# Patient Record
Sex: FEMALE | URBAN - METROPOLITAN AREA
[De-identification: names, ages, dates, MRNs, and addresses within clinical notes are randomized per-mention and may not be internally consistent; named-entity substitution may affect disease eponyms.]

---

## 2022-05-23 ENCOUNTER — APPOINTMENT (RX ONLY)
Dept: URBAN - METROPOLITAN AREA CLINIC 1 | Facility: CLINIC | Age: 32
Setting detail: DERMATOLOGY
End: 2022-05-23

## 2022-05-23 DIAGNOSIS — L81.0 POSTINFLAMMATORY HYPERPIGMENTATION: ICD-10-CM

## 2022-05-23 DIAGNOSIS — L50.8 OTHER URTICARIA: ICD-10-CM

## 2022-05-23 PROCEDURE — ? COUNSELING

## 2022-05-23 PROCEDURE — 99204 OFFICE O/P NEW MOD 45 MIN: CPT

## 2022-05-23 PROCEDURE — ? ORDER TESTS

## 2022-05-23 PROCEDURE — ? TREATMENT REGIMEN

## 2022-05-23 PROCEDURE — ? PRESCRIPTION

## 2022-05-23 RX ORDER — AMMONIUM LACTATE 12 %
LOTION (GRAM) TOPICAL
Qty: 227 | Refills: 1 | Status: ERX | COMMUNITY
Start: 2022-05-23

## 2022-05-23 RX ADMIN — Medication: at 00:00

## 2022-05-23 ASSESSMENT — LOCATION DETAILED DESCRIPTION DERM
LOCATION DETAILED: RIGHT ANTERIOR DISTAL THIGH
LOCATION DETAILED: PERIUMBILICAL SKIN
LOCATION DETAILED: RIGHT ANTECUBITAL SKIN

## 2022-05-23 ASSESSMENT — LOCATION ZONE DERM
LOCATION ZONE: TRUNK
LOCATION ZONE: ARM
LOCATION ZONE: LEG

## 2022-05-23 ASSESSMENT — LOCATION SIMPLE DESCRIPTION DERM
LOCATION SIMPLE: RIGHT UPPER ARM
LOCATION SIMPLE: ABDOMEN
LOCATION SIMPLE: RIGHT THIGH

## 2022-05-23 NOTE — HPI: DISCOLORATION (HYPERPIGMENTATION)
Is This A New Presentation, Or A Follow-Up?: Discoloration
How Severe Is It?: moderate
Additional History: Pt would like to discuss options for treating.

## 2022-05-23 NOTE — PROCEDURE: TREATMENT REGIMEN
Plan: 5/23/2022\\nCurrently not flared; reviewed patient’s pictures on cell phone \\nPt is using triamcinolone cream given by previous derm.\\nOrder lab work today—discussed with patient of the need to rule out H.Pylori in addition to Alpha Gal\\nPreviously evaluated by an allergist
Detail Level: Zone
Initiate Treatment: ammonium lactate 12 % lotion Apply to affected areas on extremities bid

## 2022-05-23 NOTE — HPI: RASH
What Type Of Note Output Would You Prefer (Optional)?: Bullet Format
How Severe Is Your Rash?: moderate
Is This A New Presentation, Or A Follow-Up?: Rash
Additional History: Pt gets hive like bumps that come and go. Pt is not currently flared today but is itchy. Pt has previously been treated by [facility] derm. Pt has been previously tested for auto immune diseases. Pt is using TAC cream.

## 2022-05-23 NOTE — PROCEDURE: ORDER TESTS
Performing Laboratory: 0
Expected Date Of Service: 05/23/2022
Bill For Surgical Tray: no
Billing Type: Third-Party Bill

## 2022-07-26 ENCOUNTER — APPOINTMENT (RX ONLY)
Dept: URBAN - METROPOLITAN AREA CLINIC 1 | Facility: CLINIC | Age: 32
Setting detail: DERMATOLOGY
End: 2022-07-26

## 2022-07-26 PROBLEM — L50.8 OTHER URTICARIA: Status: ACTIVE | Noted: 2022-07-26

## 2022-07-26 PROBLEM — L81.0 POSTINFLAMMATORY HYPERPIGMENTATION: Status: ACTIVE | Noted: 2022-07-26

## 2022-07-26 PROCEDURE — ? ORDER TESTS

## 2022-07-26 NOTE — PROCEDURE: ORDER TESTS
Expected Date Of Service: 07/26/2022
Lab Facility: 0
Bill For Surgical Tray: no
Billing Type: Third-Party Bill

## 2022-10-04 ENCOUNTER — APPOINTMENT (RX ONLY)
Dept: URBAN - METROPOLITAN AREA CLINIC 1 | Facility: CLINIC | Age: 32
Setting detail: DERMATOLOGY
End: 2022-10-04

## 2022-10-04 DIAGNOSIS — L50.1 IDIOPATHIC URTICARIA: ICD-10-CM | Status: INADEQUATELY CONTROLLED

## 2022-10-04 PROCEDURE — ? PRESCRIPTION MEDICATION MANAGEMENT

## 2022-10-04 PROCEDURE — ? COUNSELING

## 2022-10-04 PROCEDURE — 99214 OFFICE O/P EST MOD 30 MIN: CPT

## 2022-10-04 PROCEDURE — ? ADDITIONAL NOTES

## 2022-10-04 NOTE — PROCEDURE: ADDITIONAL NOTES
Additional Notes: Pt has follow up scheduled with NP 10/17 but is experiencing a new flare up and came in sooner. \\nPt has been taking loratadine 3x daily and Hydroxyzine at night but flare ups are not subsiding. \\nPt scheduled for blood work next week.
Detail Level: Generalized
Render Risk Assessment In Note?: yes

## 2022-10-04 NOTE — PROCEDURE: PRESCRIPTION MEDICATION MANAGEMENT
Discontinue Regimen: Amlactin, Hydroxyzine, Loratadine
Detail Level: Zone
Render In Strict Bullet Format?: No
Plan: Pt will follow up with NP 10/17 to review blood work\\nAdvised pt to follow up with allergist
Initiate Treatment: Apply TAC to flares bid \\nTake 1 Claritin QAM, 1 Claritin at lunch and 1 Benadryl QHS

## 2022-10-17 ENCOUNTER — APPOINTMENT (RX ONLY)
Dept: URBAN - METROPOLITAN AREA CLINIC 1 | Facility: CLINIC | Age: 32
Setting detail: DERMATOLOGY
End: 2022-10-17

## 2022-10-17 DIAGNOSIS — L50.1 IDIOPATHIC URTICARIA: ICD-10-CM | Status: INADEQUATELY CONTROLLED

## 2022-10-17 PROCEDURE — ? COUNSELING

## 2022-10-17 PROCEDURE — 99213 OFFICE O/P EST LOW 20 MIN: CPT

## 2022-10-17 PROCEDURE — ? TREATMENT REGIMEN

## 2022-10-17 NOTE — PROCEDURE: TREATMENT REGIMEN
Plan: 10/17/2022:\\n\\nLabs reviewed:  non-specific AMERICA was positive\\nContacted [facility] to add AMERICA IFA—result pending\\nAdvised patient that is added test is positive, should consult with Rheumatologist \\nRecommend starting OTC Allegra
Detail Level: Zone
Discontinue Regimen: Loratadine \\nHydroxyzine

## 2022-11-15 ENCOUNTER — APPOINTMENT (RX ONLY)
Dept: URBAN - METROPOLITAN AREA CLINIC 1 | Facility: CLINIC | Age: 32
Setting detail: DERMATOLOGY
End: 2022-11-15

## 2022-11-15 DIAGNOSIS — L50.8 OTHER URTICARIA: ICD-10-CM | Status: INADEQUATELY CONTROLLED

## 2022-11-15 PROCEDURE — ? PRESCRIPTION MEDICATION MANAGEMENT

## 2022-11-15 PROCEDURE — 99214 OFFICE O/P EST MOD 30 MIN: CPT | Mod: 95

## 2022-11-15 PROCEDURE — ? ADDITIONAL NOTES

## 2022-11-15 PROCEDURE — ? COUNSELING

## 2022-11-15 PROCEDURE — ? PRESCRIPTION

## 2022-11-15 RX ORDER — FEXOFENADINE HYDROCHLORIDE 180 MG/1
TABLET ORAL
Qty: 90 | Refills: 0 | Status: ERX | COMMUNITY
Start: 2022-11-15

## 2022-11-15 RX ADMIN — FEXOFENADINE HYDROCHLORIDE: 180 TABLET ORAL at 00:00

## 2022-11-15 NOTE — PROCEDURE: PRESCRIPTION MEDICATION MANAGEMENT
Render In Strict Bullet Format?: No
Plan: 11/22/2022:  \\nIdiopathic & chronic urticaria—patient continues to have flares \\nRecent ER visit and prednisone \\nThe more specific and sensitive AMERICA IFA was negative, therefore Rheum consult not indicated at this time\\nPatient advised that pcp will need to refer her to an allergist
Detail Level: Zone